# Patient Record
Sex: MALE | Race: WHITE | Employment: OTHER | URBAN - METROPOLITAN AREA
[De-identification: names, ages, dates, MRNs, and addresses within clinical notes are randomized per-mention and may not be internally consistent; named-entity substitution may affect disease eponyms.]

---

## 2021-12-05 ENCOUNTER — APPOINTMENT (OUTPATIENT)
Dept: CT IMAGING | Age: 86
End: 2021-12-05
Payer: MEDICARE

## 2021-12-05 ENCOUNTER — HOSPITAL ENCOUNTER (OUTPATIENT)
Age: 86
Setting detail: OBSERVATION
Discharge: ANOTHER HOSPITAL | End: 2021-12-06
Attending: EMERGENCY MEDICINE
Payer: MEDICARE

## 2021-12-05 DIAGNOSIS — N39.0 URINARY TRACT INFECTION WITHOUT HEMATURIA, SITE UNSPECIFIED: ICD-10-CM

## 2021-12-05 DIAGNOSIS — R26.2 UNABLE TO AMBULATE: ICD-10-CM
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
## 2021-12-05 PROBLEM — I48.21 PERMANENT ATRIAL FIBRILLATION (HCC): Status: ACTIVE | Noted: 2021-12-05

## 2021-12-05 PROBLEM — I10 ESSENTIAL (PRIMARY) HYPERTENSION: Status: ACTIVE | Noted: 2021-12-05

## 2021-12-05 PROBLEM — N30.00 ACUTE CYSTITIS WITHOUT HEMATURIA: Status: ACTIVE | Noted: 2021-12-05

## 2021-12-05 PROBLEM — E11.9 TYPE 2 DIABETES MELLITUS WITHOUT COMPLICATION, WITHOUT LONG-TERM CURRENT USE OF INSULIN (HCC): Status: ACTIVE | Noted: 2021-12-05

## 2021-12-05 LAB
-: ABNORMAL
-: NORMAL
ABSOLUTE EOS #: 0.1 K/UL (ref 0–0.4)
ABSOLUTE IMMATURE GRANULOCYTE: ABNORMAL K/UL (ref 0–0.3)
ABSOLUTE LYMPH #: 0.7 K/UL (ref 1–4.8)
ABSOLUTE MONO #: 0.9 K/UL (ref 0.1–1.2)
AMORPHOUS: ABNORMAL
ANION GAP SERPL CALCULATED.3IONS-SCNC: 13 MMOL/L (ref 9–17)
BACTERIA: ABNORMAL
BASOPHILS # BLD: 0 % (ref 0–2)
BASOPHILS ABSOLUTE: 0 K/UL (ref 0–0.2)
BILIRUBIN URINE: NEGATIVE
BUN BLDV-MCNC: 21 MG/DL (ref 8–23)
BUN/CREAT BLD: ABNORMAL (ref 9–20)
CALCIUM SERPL-MCNC: 9.9 MG/DL (ref 8.6–10.4)
CASTS UA: ABNORMAL /LPF
CHLORIDE BLD-SCNC: 96 MMOL/L (ref 98–107)
CO2: 21 MMOL/L (ref 20–31)
COLOR: YELLOW
COMMENT UA: ABNORMAL
CREAT SERPL-MCNC: 0.7 MG/DL (ref 0.7–1.2)
CRYSTALS, UA: ABNORMAL /HPF
DIFFERENTIAL TYPE: ABNORMAL
EOSINOPHILS RELATIVE PERCENT: 1 % (ref 1–4)
EPITHELIAL CELLS UA: ABNORMAL /HPF (ref 0–5)
GFR AFRICAN AMERICAN: >60 ML/MIN
GFR NON-AFRICAN AMERICAN: >60 ML/MIN
GFR SERPL CREATININE-BSD FRML MDRD: ABNORMAL ML/MIN/{1.73_M2}
GFR SERPL CREATININE-BSD FRML MDRD: ABNORMAL ML/MIN/{1.73_M2}
GLUCOSE BLD-MCNC: 183 MG/DL (ref 70–99)
GLUCOSE URINE: NEGATIVE
HCT VFR BLD CALC: 37.3 % (ref 41–53)
HEMOGLOBIN: 13.3 G/DL (ref 13.5–17.5)
IMMATURE GRANULOCYTES: ABNORMAL %
INR BLD: 1
KETONES, URINE: NEGATIVE
LEUKOCYTE ESTERASE, URINE: ABNORMAL
LYMPHOCYTES # BLD: 7 % (ref 24–44)
MCH RBC QN AUTO: 32.8 PG (ref 26–34)
MCHC RBC AUTO-ENTMCNC: 35.5 G/DL (ref 31–37)
MCV RBC AUTO: 92.2 FL (ref 80–100)
MONOCYTES # BLD: 10 % (ref 2–11)
MUCUS: ABNORMAL
NITRITE, URINE: POSITIVE
NRBC AUTOMATED: ABNORMAL PER 100 WBC
OTHER OBSERVATIONS UA: ABNORMAL
PARTIAL THROMBOPLASTIN TIME: 25.6 SEC (ref 21.3–31.3)
PDW BLD-RTO: 14.3 % (ref 12.5–15.4)
PH UA: 7 (ref 5–8)
PLATELET # BLD: 255 K/UL (ref 140–450)
PLATELET ESTIMATE: ABNORMAL
PMV BLD AUTO: 8.2 FL (ref 6–12)
POTASSIUM SERPL-SCNC: 4.3 MMOL/L (ref 3.7–5.3)
PROTEIN UA: ABNORMAL
PROTHROMBIN TIME: 10.7 SEC (ref 9.4–12.6)
RBC # BLD: 4.05 M/UL (ref 4.5–5.9)
RBC # BLD: ABNORMAL 10*6/UL
RBC UA: ABNORMAL /HPF (ref 0–2)
REASON FOR REJECTION: NORMAL
RENAL EPITHELIAL, UA: ABNORMAL /HPF
SARS-COV-2, RAPID: NOT DETECTED
SEG NEUTROPHILS: 82 % (ref 36–66)
SEGMENTED NEUTROPHILS ABSOLUTE COUNT: 7.7 K/UL (ref 1.8–7.7)
SODIUM BLD-SCNC: 130 MMOL/L (ref 135–144)
SPECIFIC GRAVITY UA: 1.01 (ref 1–1.03)
SPECIMEN DESCRIPTION: NORMAL
TRICHOMONAS: ABNORMAL
TURBIDITY: ABNORMAL
URINE HGB: NEGATIVE
UROBILINOGEN, URINE: NORMAL
WBC # BLD: 9.4 K/UL (ref 3.5–11)
WBC # BLD: ABNORMAL 10*3/UL
WBC UA: ABNORMAL /HPF (ref 0–5)
YEAST: ABNORMAL
ZZ NTE CLEAN UP: ORDERED TEST: NORMAL
ZZ NTE WITH NAME CLEAN UP: SPECIMEN SOURCE: NORMAL

## 2021-12-05 PROCEDURE — G0378 HOSPITAL OBSERVATION PER HR: HCPCS

## 2021-12-05 PROCEDURE — 99285 EMERGENCY DEPT VISIT HI MDM: CPT

## 2021-12-05 PROCEDURE — 2580000003 HC RX 258: Performed by: EMERGENCY MEDICINE

## 2021-12-05 PROCEDURE — 93005 ELECTROCARDIOGRAM TRACING: CPT | Performed by: EMERGENCY MEDICINE

## 2021-12-05 PROCEDURE — 85025 COMPLETE CBC W/AUTO DIFF WBC: CPT

## 2021-12-05 PROCEDURE — 70450 CT HEAD/BRAIN W/O DYE: CPT

## 2021-12-05 PROCEDURE — 70486 CT MAXILLOFACIAL W/O DYE: CPT

## 2021-12-05 PROCEDURE — 85730 THROMBOPLASTIN TIME PARTIAL: CPT

## 2021-12-05 PROCEDURE — 96365 THER/PROPH/DIAG IV INF INIT: CPT

## 2021-12-05 PROCEDURE — 99219 PR INITIAL OBSERVATION CARE/DAY 50 MINUTES: CPT | Performed by: INTERNAL MEDICINE

## 2021-12-05 PROCEDURE — 87635 SARS-COV-2 COVID-19 AMP PRB: CPT

## 2021-12-05 PROCEDURE — 96374 THER/PROPH/DIAG INJ IV PUSH: CPT

## 2021-12-05 PROCEDURE — 87186 SC STD MICRODIL/AGAR DIL: CPT

## 2021-12-05 PROCEDURE — 87086 URINE CULTURE/COLONY COUNT: CPT

## 2021-12-05 PROCEDURE — 36415 COLL VENOUS BLD VENIPUNCTURE: CPT

## 2021-12-05 PROCEDURE — 96361 HYDRATE IV INFUSION ADD-ON: CPT

## 2021-12-05 PROCEDURE — 85610 PROTHROMBIN TIME: CPT

## 2021-12-05 PROCEDURE — 81001 URINALYSIS AUTO W/SCOPE: CPT

## 2021-12-05 PROCEDURE — 86403 PARTICLE AGGLUT ANTBDY SCRN: CPT

## 2021-12-05 PROCEDURE — 6360000002 HC RX W HCPCS: Performed by: EMERGENCY MEDICINE

## 2021-12-05 PROCEDURE — 80048 BASIC METABOLIC PNL TOTAL CA: CPT

## 2021-12-05 RX ORDER — TAMSULOSIN HYDROCHLORIDE 0.4 MG/1
0.4 CAPSULE ORAL DAILY
COMMUNITY

## 2021-12-05 RX ORDER — POLYETHYLENE GLYCOL 3350 17 G/17G
17 POWDER, FOR SOLUTION ORAL DAILY PRN
Status: CANCELLED | OUTPATIENT
Start: 2021-12-05

## 2021-12-05 RX ORDER — MAGNESIUM SULFATE 1 G/100ML
1000 INJECTION INTRAVENOUS PRN
Status: CANCELLED | OUTPATIENT
Start: 2021-12-05

## 2021-12-05 RX ORDER — POTASSIUM CHLORIDE 7.45 MG/ML
10 INJECTION INTRAVENOUS PRN
Status: CANCELLED | OUTPATIENT
Start: 2021-12-05

## 2021-12-05 RX ORDER — 0.9 % SODIUM CHLORIDE 0.9 %
500 INTRAVENOUS SOLUTION INTRAVENOUS ONCE
Status: COMPLETED | OUTPATIENT
Start: 2021-12-05 | End: 2021-12-05

## 2021-12-05 RX ORDER — OMEPRAZOLE 20 MG/1
20 CAPSULE, DELAYED RELEASE ORAL DAILY
COMMUNITY

## 2021-12-05 RX ORDER — ACETAMINOPHEN 650 MG/1
650 SUPPOSITORY RECTAL EVERY 6 HOURS PRN
Status: CANCELLED | OUTPATIENT
Start: 2021-12-05

## 2021-12-05 RX ORDER — ONDANSETRON 2 MG/ML
4 INJECTION INTRAMUSCULAR; INTRAVENOUS EVERY 6 HOURS PRN
Status: CANCELLED | OUTPATIENT
Start: 2021-12-05

## 2021-12-05 RX ORDER — SODIUM CHLORIDE 9 MG/ML
25 INJECTION, SOLUTION INTRAVENOUS PRN
Status: CANCELLED | OUTPATIENT
Start: 2021-12-05

## 2021-12-05 RX ORDER — ACETAMINOPHEN 325 MG/1
650 TABLET ORAL EVERY 6 HOURS PRN
Status: CANCELLED | OUTPATIENT
Start: 2021-12-05

## 2021-12-05 RX ORDER — CEPHALEXIN 500 MG/1
500 CAPSULE ORAL 2 TIMES DAILY
Qty: 20 CAPSULE | Refills: 0 | Status: SHIPPED | OUTPATIENT
Start: 2021-12-06 | End: 2021-12-06 | Stop reason: SDUPTHER

## 2021-12-05 RX ORDER — METFORMIN HYDROCHLORIDE 750 MG/1
750 TABLET, EXTENDED RELEASE ORAL
COMMUNITY

## 2021-12-05 RX ORDER — SODIUM CHLORIDE 0.9 % (FLUSH) 0.9 %
10 SYRINGE (ML) INJECTION PRN
Status: CANCELLED | OUTPATIENT
Start: 2021-12-05

## 2021-12-05 RX ORDER — SODIUM CHLORIDE 0.9 % (FLUSH) 0.9 %
5-40 SYRINGE (ML) INJECTION EVERY 12 HOURS SCHEDULED
Status: CANCELLED | OUTPATIENT
Start: 2021-12-05

## 2021-12-05 RX ORDER — FINASTERIDE 5 MG/1
5 TABLET, FILM COATED ORAL DAILY
COMMUNITY

## 2021-12-05 RX ORDER — POTASSIUM CHLORIDE 20 MEQ/1
40 TABLET, EXTENDED RELEASE ORAL PRN
Status: CANCELLED | OUTPATIENT
Start: 2021-12-05

## 2021-12-05 RX ORDER — ONDANSETRON 4 MG/1
4 TABLET, ORALLY DISINTEGRATING ORAL EVERY 8 HOURS PRN
Status: CANCELLED | OUTPATIENT
Start: 2021-12-05

## 2021-12-05 RX ORDER — ATORVASTATIN CALCIUM 40 MG/1
40 TABLET, FILM COATED ORAL DAILY
COMMUNITY

## 2021-12-05 RX ADMIN — SODIUM CHLORIDE 500 ML: 9 INJECTION, SOLUTION INTRAVENOUS at 17:21

## 2021-12-05 RX ADMIN — CEFTRIAXONE SODIUM 1000 MG: 1 INJECTION, POWDER, FOR SOLUTION INTRAMUSCULAR; INTRAVENOUS at 17:21

## 2021-12-05 ASSESSMENT — ENCOUNTER SYMPTOMS
CHEST TIGHTNESS: 0
SHORTNESS OF BREATH: 0
ABDOMINAL PAIN: 0

## 2021-12-05 NOTE — ED NOTES
Dr Murillo/PCT x2 at bedside with walker/gait belt to assess ambulation     Jovanny Hobbs RN  12/05/21 1432

## 2021-12-05 NOTE — ED NOTES
Jose Luis Acevedo from [address] [facility] services contacted at [phone].  She will contact son in room at number given to discuss placement options in ECF/rehab/ SNF     Hay Langford RN  12/05/21 3938

## 2021-12-05 NOTE — ED NOTES
Dr Dueñas consulted for admission- pt unable to stand using walker- ambulate- gait extremely unsteady.       Jovanny Hobbs, RN  12/05/21 [id number]/Torsten Hunter RN  12/05/21 9984 home

## 2021-12-05 NOTE — ED PROVIDER NOTES
history on file. SOCIAL HISTORY       Social History     Socioeconomic History    Marital status: Single     Spouse name: Not on file    Number of children: Not on file    Years of education: Not on file    Highest education level: Not on file   Occupational History    Not on file   Tobacco Use    Smoking status: Not on file    Smokeless tobacco: Not on file   Substance and Sexual Activity    Alcohol use: Not on file    Drug use: Not on file    Sexual activity: Not on file   Other Topics Concern    Not on file   Social History Narrative    Not on file     Social Determinants of Health     Financial Resource Strain:     Difficulty of Paying Living Expenses: Not on file   Food Insecurity:     Worried About Running Out of Food in the Last Year: Not on file    Jonathan of Food in the Last Year: Not on file   Transportation Needs:     Lack of Transportation (Medical): Not on file    Lack of Transportation (Non-Medical):  Not on file   Physical Activity:     Days of Exercise per Week: Not on file    Minutes of Exercise per Session: Not on file   Stress:     Feeling of Stress : Not on file   Social Connections:     Frequency of Communication with Friends and Family: Not on file    Frequency of Social Gatherings with Friends and Family: Not on file    Attends Jain Services: Not on file    Active Member of [address] [place] [address] [employer] or Organizations: Not on file    Attends Club or Organization Meetings: Not on file    Marital Status: Not on file   Intimate Partner Violence:     Fear of Current or Ex-Partner: Not on file    Emotionally Abused: Not on file    Physically Abused: Not on file    Sexually Abused: Not on file   Housing Stability:     Unable to Pay for Housing in the Last Year: Not on file    Number of [place] in the Last Year: Not on file    Unstable Housing in the Last Year: Not on file       SCREENINGS    Diya Coma Scale  Eye Opening: Spontaneous  Best Verbal Response: Oriented  Best Motor Response: Obeys commands  Diya Coma Scale Score: 15        PHYSICAL EXAM    (up to 7 for level 4, 8 or more for level 5)     ED Triage Vitals [12/05/21 1421]   BP Temp Temp src Pulse Resp SpO2 Height Weight   (!) 141/96 -- -- 75 14 96 % 5' 10\" (1.778 m) 167 lb (75.8 kg)       Physical Exam  Vitals reviewed. Constitutional:       General: He is not in acute distress. Appearance: Normal appearance. He is not ill-appearing. HENT:      Head: Normocephalic. Right Ear: External ear normal.      Ears:      Comments: Patient has a hematoma of the inferior thirds of the left auricle. There is no obvious laceration. No drainage is seen coming from the ear canal.     Nose: Nose normal.      Mouth/Throat:      Mouth: Mucous membranes are moist.   Eyes:      Extraocular Movements: Extraocular movements intact. Pupils: Pupils are equal, round, and reactive to light. Cardiovascular:      Rate and Rhythm: Normal rate. Rhythm irregular. Pulmonary:      Effort: Pulmonary effort is normal. No respiratory distress. Breath sounds: Normal breath sounds. Chest:      Chest wall: No tenderness. Abdominal:      Palpations: Abdomen is soft. Tenderness: There is no abdominal tenderness. Musculoskeletal:         General: Normal range of motion. Cervical back: Neck supple. No tenderness. Right lower leg: Edema present. Left lower leg: Edema present. Comments: Superficial skin tear over the posterior aspect of the left elbow with no effusion or limitation in range of motion and no deformity. The rest of the bony survey of his extremities is negative. Skin:     General: Skin is warm and dry. Coloration: Skin is not pale. Neurological:      General: No focal deficit present. Mental Status: He is alert and oriented to person, place, and time.          DIAGNOSTIC RESULTS     EKG: All EKG's are interpreted by the Emergency Department Physician who either signs orCo-signs this chart in the absence of a cardiologist.    Atrial fibrillation with controlled rate of 78 beats a minute. Normal axis. Old septal infarct. No acute ischemic change. RADIOLOGY:     Interpretation per the Radiologist below, ifavailable at the time of this note:    CT Head WO Contrast   Final Result   No acute intracranial abnormality. Diffuse atrophic changes with findings suggesting chronic microvascular   ischemia         CT FACIAL BONES WO CONTRAST   Final Result   No acute traumatic injury of the facial bones. ED BEDSIDE ULTRASOUND:   Performed by ED Physician - none    LABS:  Labs Reviewed   CBC WITH AUTO DIFFERENTIAL - Abnormal; Notable for the following components:       Result Value    RBC 4.05 (*)     Hemoglobin 13.3 (*)     Hematocrit 37.3 (*)     Seg Neutrophils 82 (*)     Lymphocytes 7 (*)     Absolute Lymph # 0.70 (*)     All other components within normal limits   BASIC METABOLIC PANEL W/ REFLEX TO MG FOR LOW K - Abnormal; Notable for the following components:    Glucose 183 (*)     Sodium 130 (*)     Chloride 96 (*)     All other components within normal limits   URINE RT REFLEX TO CULTURE - Abnormal; Notable for the following components:    Turbidity UA SLIGHTLY CLOUDY (*)     Protein, UA 2+ (*)     Nitrite, Urine POSITIVE (*)     Leukocyte Esterase, Urine MODERATE (*)     All other components within normal limits   MICROSCOPIC URINALYSIS - Abnormal; Notable for the following components:    Bacteria, UA MANY (*)     Other Observations UA Culture ordered based on defined criteria. (*)     All other components within normal limits   CULTURE, URINE   SPECIMEN REJECTION   PROTIME-INR   APTT   PREVIOUS SPECIMEN       All other labs were within normal range ornot returned as of this dictation.     EMERGENCY DEPARTMENT COURSE and DIFFERENTIAL DIAGNOSIS/MDM:   Vitals:    Vitals:    12/05/21 1421 12/05/21 1722 12/05/21 1723   BP: (!) 141/96 (!) 194/78    Pulse: 75 77 Resp: 14 15    Temp:   97.5 °F (36.4 °C)   TempSrc:   Oral   SpO2: 96% 99%    Weight: 75.8 kg (167 lb)     Height: 5' 10\" (1.778 m)           Patient has UTI. Imaging of the head and face is negative. Baseline bloodwork is also unremarkable. The plan is to treat him with 1/2 L of IV fluids and Rocephin 1 g IV. He will be placed on Keflex at home for his UTI and will be referred to his primary care physician for further follow-up with instructions to use a cane or walker for assisted ambulation and to return for worsening symptoms. ED Course as of 12/05/21 1807   Sun Dec 05, 2021   1805 Patient wants to be discharged with his son stated time that he was unable to get his father to get up and walk by himself and he feels that he is not safe at home. Patient was assisted off the bed but even standing while using a walker and with a gait belt around him he could not take a single step and could not maintain his balance. The plan is to admit the patient now and he has been accepted by Dr. Mathieu Mcnair. [SH]      MYNOR Diaz  [SH] Kelsey Rae MD       MDM    CONSULTS:  None    PROCEDURES:  Unlessotherwise noted below, none     Procedures    FINAL IMPRESSION      1. Fall, initial encounter    2. Urinary tract infection without hematuria, site unspecified    3.  Unable to ambulate          DISPOSITION/PLAN   DISPOSITION Admitted 12/05/2021 06:05:53 PM      PATIENT REFERRED TO:  Filiberto Galvez MD  [facility] [address] [place] [address]  [phone]            DISCHARGE MEDICATIONS:         Problem List:  Patient Active Problem List   Diagnosis Code    Unable to ambulate R26.2           Summation      Patient Course: Discharged    ED Medicationsadministered this visit:    Medications   cefTRIAXone (ROCEPHIN) 1000 mg IVPB in 50 mL D5W minibag (1,000 mg IntraVENous New Bag 12/5/21 1721)   0.9 % sodium chloride bolus (500 mLs IntraVENous New Bag 12/5/21 1721)       New Prescriptions from this visit:    New Prescriptions    CEPHALEXIN (KEFLEX) 500 MG CAPSULE    Take 1 capsule by mouth 2 times daily for 10 days       Follow-up:  [facility] [address] [place] [address]  [phone]              Final Impression:   1. Fall, initial encounter    2. Urinary tract infection without hematuria, site unspecified    3.  Unable to ambulate               (Please note that portions of this note were completed with a voice recognitionprogram.  Efforts were made to edit the dictations but occasionally words are mis-transcribed.)    Isaak Thomas MD (electronically signed)  Attending Emergency Physician            Isaak Thomas MD  12/05/21 3356 Nicolás Metz MD  12/05/21 6234

## 2021-12-05 NOTE — H&P
[facility]  Office: [address], DO, Bijan Carrillo, DO, Hira Acevedo, DO, Kari Estevez Blood, DO, Brenda Pineda MD, Esperanza Pozo MD, Lorene Del Valle MD, Maylin Lui MD, Wellington Patiño MD, Jl Connelly MD, Patricia Holloway MD, Vazquez Perez, DO, Corinna Langston, DO, María Malik MD,  Zhou Burris DO, Ronald Bell MD, Bunny Ramirez MD, Boubacar Dueñas MD, Mary Hdez MD, Armando Fitzpatrick MD, Ricco Mcbride MD, Alvina Griffith MD, Davion Reid, [facility], [facility], CNP, Lorri Cortes, CNP, Adam Handley, CNS, Ailene Lesches, CNP, Martha Richards, CNP, Jennifer Bro, CNP, Rabia Jenkins, CNP, Dona Jaramillo, CNP, Vicenet Hayes PA-C, Kvng Collado, [facility], Alfredo Bustillos, CNP, Gabriella Gutierrez, CNP, Boris Ovalle, CNP, Kacie Gregory, CNP, Nanda Fernando, CNP, Penny Aguila, CNP, Clarisa Juárez, CNP         [facility]   [address] [facility]    HISTORY AND PHYSICAL EXAMINATION            Date:   12/5/2021  Patient name:  William Maravilla  Date of admission:  12/5/2021  2:05 PM  MRN:   [id number]  Account:  [de-identified]  YOB: 1935  PCP:    Casandra Lewis MD  Room:   ER/ER08  Code Status:    No Order    Chief Complaint:     Chief Complaint   Patient presents with    Fall       History Obtained From:     patient, electronic medical record    History of Present Illness:     William Maravilla is a 82 y.o. Non- / non  male who presents with Fall   and is admitted to the hospital for the management of Unable to ambulate. This 82 yom presents with a fall. He states he tripped over his feet and fell. No loc but did hit his head. No syncope. Apparently son stated he was uncomfortable taking him back home and when ER staff tried to walk him, he basically just stood there and wouldn't take steps.   Took couple people to hold him up    Past Medical History:     Past Medical History:   Diagnosis Date    Dyslipidemia     Essential (primary) hypertension     Permanent atrial fibrillation (HCC)     Type 2 diabetes mellitus without complication, without long-term current use of insulin ([facility] [address]         Past Surgical History:     Past Surgical History:   Procedure Laterality Date    HIP SURGERY          Medications Prior to Admission:     Prior to Admission medications    Medication Sig Start Date End Date Taking? Authorizing Provider   cephALEXin (KEFLEX) 500 MG capsule Take 1 capsule by mouth 2 times daily for 10 days 12/6/21 12/16/21 Yes Fabricio Babb MD        Allergies:     Patient has no known allergies. Social History:     Tobacco:    reports that he has quit smoking. He does not have any smokeless tobacco history on file. Alcohol:      reports previous alcohol use. Drug Use:  reports no history of drug use. Family History:     Family History   Problem Relation Age of Onset    Coronary Art Dis Father        Review of Systems:     Positive and Negative as described in HPI.     CONSTITUTIONAL:  negative for fevers, chills, sweats, weight loss  HEENT:  negative for vision, hearing changes, runny nose, throat pain  RESPIRATORY:  negative for shortness of breath, cough, congestion, wheezing  CARDIOVASCULAR:  negative for chest pain, palpitations  GASTROINTESTINAL:  negative for nausea, vomiting, diarrhea, constipation, change in bowel habits, abdominal pain   GENITOURINARY:  negative for difficulty of urination, burning with urination, frequency   INTEGUMENT:  negative for rash, skin lesions, easy bruising   HEMATOLOGIC/LYMPHATIC:  negative for swelling/edema   ALLERGIC/IMMUNOLOGIC:  negative for urticaria , itching  ENDOCRINE:  negative increase in drinking, increase in urination, hot or cold intolerance  MUSCULOSKELETAL:  negative joint pains, muscle aches, swelling of joints; has generalized weakness  NEUROLOGICAL:  negative for headaches, dizziness, lightheadedness, numbness, pain, tingling extremities  BEHAVIOR/PSYCH:  negative for depression, anxiety    Physical Exam:   BP (!) 194/78   Pulse 77   Temp 97.5 °F (36.4 °C) (Oral)   Resp 15   Ht 5' 10\" (1.778 m)   Wt 167 lb (75.8 kg)   SpO2 99%   BMI 23.96 kg/m²   Temp (24hrs), Av.5 °F (36.4 °C), Min:97.5 °F (36.4 °C), Max:97.5 °F (36.4 °C)    No results for input(s): POCGLU in the last 72 hours.     Intake/Output Summary (Last 24 hours) at 2021 1843  Last data filed at 2021 1809  Gross per 24 hour   Intake 50 ml   Output 250 ml   Net -200 ml       General Appearance:  alert, well appearing, and in no acute distress  Mental status: oriented to person, place, and time  Head:  normocephalic, atraumatic  Eye: no icterus, redness, pupils equal and reactive, extraocular eye movements intact, conjunctiva clear  Ear: normal external ear, no discharge, hearing intact  Nose:  no drainage noted  Mouth: mucous membranes moist  Neck: supple, no carotid bruits, thyroid not palpable  Lungs: Bilateral equal air entry, clear to ausculation, no wheezing, rales or rhonchi, normal effort  Cardiovascular: normal rate, irregularly irregular rhythm, no murmur, gallop, rub  Abdomen: Soft, nontender, nondistended, normal bowel sounds, no hepatomegaly or splenomegaly  Neurologic: There are no new focal motor or sensory deficits, normal muscle tone and bulk, no abnormal sensation, normal speech, cranial nerves II through XII grossly intact  Skin: No gross lesions, rashes, bruising or bleeding on exposed skin area  Extremities:  peripheral pulses palpable, no pedal edema or calf pain with palpation  Psych: normal affect     Investigations:      Laboratory Testing:  Recent Results (from the past 24 hour(s))   EKG 12 Lead    Collection Time: 21  2:48 PM   Result Value Ref Range    Ventricular Rate 78 BPM    Atrial Rate 91 BPM    QRS Duration 80 ms    Q-T Interval 382 ms    QTc Calculation (Bazett) 435 ms    R Axis 3 degrees    T Axis 27 degrees   CBC Auto Differential    Collection Time: 21 2:54 PM   Result Value Ref Range    WBC 9.4 3.5 - 11.0 k/uL    RBC 4.05 (L) 4.5 - 5.9 m/uL    Hemoglobin 13.3 (L) 13.5 - 17.5 g/dL    Hematocrit 37.3 (L) 41 - 53 %    MCV 92.2 80 - 100 fL    MCH 32.8 26 - 34 pg    MCHC 35.5 31 - 37 g/dL    RDW 14.3 12.5 - 15.4 %    Platelets 879 505 - 729 k/uL    MPV 8.2 6.0 - 12.0 fL    NRBC Automated NOT REPORTED per 100 WBC    Differential Type NOT REPORTED     Seg Neutrophils 82 (H) 36 - 66 %    Lymphocytes 7 (L) 24 - 44 %    Monocytes 10 2 - 11 %    Eosinophils % 1 1 - 4 %    Basophils 0 0 - 2 %    Immature Granulocytes NOT REPORTED 0 %    Segs Absolute 7.70 1.8 - 7.7 k/uL    Absolute Lymph # 0.70 (L) 1.0 - 4.8 k/uL    Absolute Mono # 0.90 0.1 - 1.2 k/uL    Absolute Eos # 0.10 0.0 - 0.4 k/uL    Basophils Absolute 0.00 0.0 - 0.2 k/uL    Absolute Immature Granulocyte NOT REPORTED 0.00 - 0.30 k/uL    WBC Morphology NOT REPORTED     RBC Morphology NOT REPORTED     Platelet Estimate NOT REPORTED    Basic Metabolic Panel w/ Reflex to MG    Collection Time: 12/05/21  2:54 PM   Result Value Ref Range    Glucose 183 (H) 70 - 99 mg/dL    BUN 21 8 - 23 mg/dL    CREATININE 0.70 0.70 - 1.20 mg/dL    Bun/Cre Ratio NOT REPORTED 9 - 20    Calcium 9.9 8.6 - 10.4 mg/dL    Sodium 130 (L) 135 - 144 mmol/L    Potassium 4.3 3.7 - 5.3 mmol/L    Chloride 96 (L) 98 - 107 mmol/L    CO2 21 20 - 31 mmol/L    Anion Gap 13 9 - 17 mmol/L    GFR Non-African American >60 >60 mL/min    GFR African American >60 >60 mL/min    GFR Comment          GFR Staging NOT REPORTED    SPECIMEN REJECTION    Collection Time: 12/05/21  2:54 PM   Result Value Ref Range    Specimen Source . BLOOD     Ordered Test PT PTT     Reason for Rejection Unable to perform testing: No specimen received.      - NOT REPORTED    Protime-INR    Collection Time: 12/05/21  3:10 PM   Result Value Ref Range    Protime 10.7 9.4 - 12.6 sec    INR 1.0    APTT    Collection Time: 12/05/21  3:10 PM   Result Value Ref Range    PTT 25.6 21.3 - 31.3 sec Urinalysis Reflex to Culture    Collection Time: 12/05/21  4:00 PM    Specimen: Urine, clean catch   Result Value Ref Range    Color, UA Yellow Yellow    Turbidity UA SLIGHTLY CLOUDY (A) Clear    Glucose, Ur NEGATIVE NEGATIVE    Bilirubin Urine NEGATIVE NEGATIVE    Ketones, Urine NEGATIVE NEGATIVE    Specific Gravity, UA 1.015 1.005 - 1.030    Urine Hgb NEGATIVE NEGATIVE    pH, UA 7.0 5.0 - 8.0    Protein, UA 2+ (A) NEGATIVE    Urobilinogen, Urine Normal Normal    Nitrite, Urine POSITIVE (A) NEGATIVE    Leukocyte Esterase, Urine MODERATE (A) NEGATIVE    Urinalysis Comments NOT REPORTED    Microscopic Urinalysis    Collection Time: 12/05/21  4:00 PM   Result Value Ref Range    -          WBC, UA TOO NUMEROUS TO COUNT 0 - 5 /HPF    RBC, UA 0 TO 2 0 - 2 /HPF    Casts UA NOT REPORTED /LPF    Crystals, UA NOT REPORTED None /HPF    Epithelial Cells UA 5 TO 10 0 - 5 /HPF    Renal Epithelial, UA NOT REPORTED 0 /HPF    Bacteria, UA MANY (A) None    Mucus, UA NOT REPORTED None    Trichomonas, UA NOT REPORTED None    Amorphous, UA NOT REPORTED None    Other Observations UA Culture ordered based on defined criteria. (A) NOT REQ. Yeast, UA NOT REPORTED None       Imaging/Diagnostics:  CT Head WO Contrast    Result Date: 12/5/2021  No acute intracranial abnormality. Diffuse atrophic changes with findings suggesting chronic microvascular ischemia     CT FACIAL BONES WO CONTRAST    Result Date: 12/5/2021  No acute traumatic injury of the facial bones. Assessment :      Hospital Problems           Last Modified POA    * (Principal) Unable to ambulate 12/5/2021 Yes    Type 2 diabetes mellitus without complication, without long-term current use of insulin ([address] 12/5/2021 Yes    Essential (primary) hypertension 12/5/2021 Yes    Permanent atrial fibrillation ([address] 12/5/2021 Yes    Acute cystitis without hematuria 12/5/2021 Yes          Plan:     Patient status observation in the Med/Surge    1.  Pt/ot evals  2. lsw for snf

## 2021-12-05 NOTE — ED NOTES
Social work: spoke to son Urmila [facility] [phone] His number is incorrect in the computer. Son asked that pt be placed as he cannot walk today. Son is open to any place in [facility] is the place his dad liked best in the past. However he is open to [facility] taking pt for rehab then if pt needs long term they can go closer to home if needed. Either is fine with son. He will be available by phone. Face sheet sent to [facility]. And will try to find [employer] phone and fax to leave a message for them also. Pt has medicare and co insurance he may have skilled time. He was last in a snf 9 months ago per son and he did like the PT at [facility].   Will need PT EVAL in am if possible and / to help with atul, Rx and discharge order for vladimir/yoan Anthony, NANCIE, LEYDA  12/05/21 7368

## 2021-12-05 NOTE — ED NOTES
Son speaking with Dr Chantal Rivera re: not feeling comfortable taking pt home     David Merrill RN  12/05/21 8296

## 2021-12-06 VITALS
OXYGEN SATURATION: 95 % | BODY MASS INDEX: 23.91 KG/M2 | HEART RATE: 77 BPM | RESPIRATION RATE: 16 BRPM | DIASTOLIC BLOOD PRESSURE: 65 MMHG | TEMPERATURE: 98.7 F | WEIGHT: 167 LBS | SYSTOLIC BLOOD PRESSURE: 119 MMHG | HEIGHT: 70 IN

## 2021-12-06 LAB
EKG ATRIAL RATE: 91 BPM
EKG Q-T INTERVAL: 382 MS
EKG QRS DURATION: 80 MS
EKG QTC CALCULATION (BAZETT): 435 MS
EKG R AXIS: 3 DEGREES
EKG T AXIS: 27 DEGREES
EKG VENTRICULAR RATE: 78 BPM

## 2021-12-06 PROCEDURE — 99217 PR OBSERVATION CARE DISCHARGE MANAGEMENT: CPT | Performed by: INTERNAL MEDICINE

## 2021-12-06 PROCEDURE — G0378 HOSPITAL OBSERVATION PER HR: HCPCS

## 2021-12-06 PROCEDURE — 97530 THERAPEUTIC ACTIVITIES: CPT

## 2021-12-06 PROCEDURE — 97162 PT EVAL MOD COMPLEX 30 MIN: CPT

## 2021-12-06 PROCEDURE — 97166 OT EVAL MOD COMPLEX 45 MIN: CPT

## 2021-12-06 PROCEDURE — 97535 SELF CARE MNGMENT TRAINING: CPT

## 2021-12-06 PROCEDURE — 6360000002 HC RX W HCPCS: Performed by: EMERGENCY MEDICINE

## 2021-12-06 RX ORDER — ALBUTEROL SULFATE 2.5 MG/3ML
2.5 SOLUTION RESPIRATORY (INHALATION) EVERY 4 HOURS PRN
Status: DISCONTINUED | OUTPATIENT
Start: 2021-12-06 | End: 2021-12-06 | Stop reason: HOSPADM

## 2021-12-06 RX ORDER — CEPHALEXIN 500 MG/1
500 CAPSULE ORAL 2 TIMES DAILY
Qty: 10 CAPSULE | Refills: 0 | DISCHARGE
Start: 2021-12-06 | End: 2021-12-11

## 2021-12-06 RX ADMIN — ALBUTEROL SULFATE 2.5 MG: 2.5 SOLUTION RESPIRATORY (INHALATION) at 06:39

## 2021-12-06 NOTE — ED NOTES
Pt arrives via EMS from home post fall in bathroom. Pt states he was using walker- attempting to back out of restroom when he fell- striking left arm/side of head landing on hip. . Pt alert, oriented but per family may have onset dementia- will at times make statements not relate to situation. Son reports pt increased confusion and weakness.       Sorin Yanes RN  12/05/21 6907

## 2021-12-06 NOTE — PROGRESS NOTES
Activity Restriction  Other position/activity restrictions: Up with assistance  Vision/Hearing  Vision: Impaired  Vision Exceptions: Wears glasses for reading  Hearing: Exceptions to [facility]  Hearing Exceptions: Hard of hearing/hearing concerns     Subjective  General  Patient assessed for rehabilitation services?: Yes   Response To Previous Treatment: Not applicable  Family / Caregiver Present: No  Follows Commands:  (simple commands with increased time- slow to process)  Subjective  Subjective: Pt supine in bed and agreeable to therapy this morning. Pt denies pain when asked. RN agreeable to therapy. Pain Screening  Patient Currently in Pain: Denies   Vital Signs  Patient Currently in Pain: Denies        Orientation  Orientation  Overall Orientation Status: Impaired  Orientation Level: Oriented to place; Oriented to person; Disoriented to time; Disoriented to situation  Social/Functional History  Social/Functional History  Lives With: Family (son Jennifer Sprague lives with him)  Type of Home: House  Home Layout: Two level, Able to Live on Main level with bedroom/bathroom, Performs ADL's on one level  Home Access: Level entry  Bathroom Shower/Tub: Walk-in shower  Bathroom Toilet: Handicap height  Bathroom Equipment: Grab bars in shower, Shower chair, Grab bars around toilet  Home Equipment: Rolling walker  ADL Assistance: Independent (Pt reports independent but notes he has had a harder time recently and his son will help off and on.  Notes even when ADLs have been difficult he has been able to still perform his toileting independently.)  Homemaking Assistance: Needs assistance  Homemaking Responsibilities: No (son performs all IADLs and hired cleaning)  Ambulation Assistance: Independent (with walker)  Transfer Assistance: Independent  Active : No  Patient's  Info: son drives  Mode of Transportation: Family, SUV  Occupation: Retired  Type of occupation: Worked for an   Leisure & Hobbies: Enjoys spending time with family and watching TV  Additional Comments: Pt reports his son is retired and home most of the time. He states when his son has to leave that another family member will come stay with him. Pt is a questionable historian and no family is present to confirm any of the above home information. Cognition   Cognition  Overall Cognitive Status: Exceptions  Arousal/Alertness: Delayed responses to stimuli  Following Commands: Follows one step commands with increased time; Follows one step commands with repetition; Inconsistently follows commands  Attention Span: Difficulty dividing attention; Difficulty attending to directions  Memory: Decreased recall of recent events; Decreased short term memory; Decreased recall of precautions  Safety Judgement: Decreased awareness of need for assistance; Decreased awareness of need for safety  Problem Solving: Assistance required to identify errors made; Assistance required to correct errors made; Decreased awareness of errors  Insights: Decreased awareness of deficits  Initiation: Requires cues for all  Sequencing: Requires cues for all    Objective          AROM RLE (degrees)  RLE AROM: WFL  AROM LLE (degrees)  LLE AROM : WFL  AROM RUE (degrees)  RUE AROM : WFL  AROM LUE (degrees)  LUE AROM : WFL  Strength RLE  Comment: difficulty following accurate commands for MMT; able to demonstrate 3+ to 4-/5  Strength LLE  Comment: difficulty following accurate commands for MMT; able to demonstrate 3+ to 4-/5  Strength RUE  Comment: Co evaluation with OT-see OT evaluation for full UE assessment  Strength LUE  Comment: Co evaluation with OT-see OT evaluation for full UE assessment     Sensation  Overall Sensation Status: WFL (Pt denies any numbness or tingling)  Bed mobility  Supine to Sit: Maximum assistance  Sit to Supine: Maximum assistance; 2 Person assistance  Scooting: Maximal assistance  Transfers  Sit to Stand:  Moderate Assistance; 2 Person Assistance  Stand to sit: Moderate Assistance; 2 Person Assistance  Comment: mod to max verbal cues for sequencing  Ambulation  Ambulation?: Yes  More Ambulation?: Yes  Ambulation 1  Surface: level tile  Device: [address]:  Moderate assistance; 2 Person assistance  Quality of Gait: decreased step length, decreased gait speed, poor ability to advance LEs, confusion with RW management  Gait Deviations: Slow Angelica; Decreased step length; Decreased step height  Distance: 1ft  Ambulation 2  Surface - 2: level tile  Device 2:  (bilateral HHA)  Assistance 2: Moderate assistance; 2 Person assistance  Quality of Gait 2: Cues to increase step length and initiate gait, decreased ability to initiate steps without max cues  Gait Deviations: Slow Angelica; Decreased step length; Decreased step height; Shuffles  Distance: 4ft, seated rest break, 5ft  Stairs/Curb  Stairs?: No     Balance  Posture: Poor  Sitting - Static: Poor  Sitting - Dynamic: Poor; -  Standing - Static: Poor  Standing - Dynamic: Poor  Comments: standing balance assessed with RW and HHA        Plan   Plan  Times per week: 5-6x  Times per day: Daily  Current Treatment Recommendations: Strengthening, Transfer Training, Balance Training, Functional Mobility Training, Endurance Training, Gait Training, Home Exercise Program, Safety Education & Training, Patient/Caregiver Education & Training, Cognitive Reorientation  Safety Devices  Type of devices: Call light within reach, Left in bed, Bed alarm in place, Nurse notified, Gait belt (all four bed rails up upon arrival and placed again upon exit)  Restraints  Initially in place: No      AM-PAC Score  AM-PAC Inpatient Mobility Raw Score : 10 (12/06/21 1005)  AM-PAC Inpatient T-Scale Score : 32.29 (12/06/21 1005)  Mobility Inpatient CMS 0-100% Score: 76.75 (12/06/21 1005)  Mobility Inpatient CMS G-Code Modifier : CL (12/06/21 1005)          Goals  Short term goals  Time Frame for Short term goals: 14 visits  Short term goal 1: Pt to ambulate 100ft CGA RW  Short term goal 2: Pt to sit <> stand transfer CGA  Short term goal 3: Pt to demonstrate CGA bed mobility  Short term goal 4: Pt to demonstrate fair + standing balance to decrease risk of falls  Short term goal 5: Pt to tolerate 30 minutes of therapy for endurance       Therapy Time   Individual Concurrent Group Co-treatment   Time In 0903         Time Out 0935         Minutes 32         Timed Code Treatment Minutes: [address] [facility] Yamile Soares, ANA

## 2021-12-06 NOTE — CARE COORDINATION
8420 Call to [facility] in , left message for admissions. [address] with Ellen Morris at [facility], unable to locate referral at this time. Resent. Patient has been evaluated by PT/OT and referrals to [facility] have been sent. Await response.

## 2021-12-06 NOTE — CARE COORDINATION
Discharge [id number] [facility]  Clinical Case Management Department  Written by: Bhavana Duarte RN    Patient Name: Daysi Ackerman  Attending Provider: Amber Crenshaw DO  Admit Date: 2021  2:05 PM  MRN: [id number]  Account: [de-identified]                     : 1935  Discharge Date:  2021      Disposition: [facility] via [facility] transport at 2pm - updated patient son Chantal Harris via phone.     Bhavana Duarte RN

## 2021-12-06 NOTE — PROGRESS NOTES
[facility]  Office: [address], DO, Miriam Carol, DO, Karena Mcghee, DO, Kadeem Ellsworth Blood, DO, Debora Martinez MD, Vesna Hawkins MD, Leticia Cartagena MD, Ray Blood MD, Toñito Howard MD, Katt Handley MD, Lauren Yang MD, Babatunde Miller, DO, Rodney Whitaker, DO, Noemi Jolly MD,  Trevor Diaz DO, Porsche Veras MD, Josey Fernandez MD, Shraddha Unger MD, Madelyn Christian MD, Gemini Dillard MD, Jenna Heath MD, Sal Plascencia MD, Soy Best, [facility], [facility], CNP, Angelique Garcia, CNP, Zachary Haines, CNS, Ananth Guillermo, [facility], Sarika Frausto, CNP, Patrick Rolon, [facility], Gerardo Thomason, [facility], Marcie Millard, CNP, Rowan Oppenheim, PA-C, Tahmina Banks, [facility], Robert Uribe, CNP, Fawn Allen, CNP, Mariam Uriarte, CNP, Miriam Mata, CNP, Yesenia Meraz, CNP, Frank Mejia, CNP, Jamari Canada, Alessandra [address]    Progress Note    12/6/2021    1:09 PM    Name:   Barbara Ugalde  MRN:     [id number]     [place]:      [de-identified]   Room:   [facility]/[address] Day:  0  Admit Date:  12/5/2021  2:05 PM    PCP:   Janel Patel MD  Code Status:  Full Code    Subjective:     C/C:   Chief Complaint   Patient presents with   Orvel Lisa     Interval History Status: improved. Patient seen and examined at bedside today. Patient is only oriented to place. Denies any complaints. No fevers, chills, nausea, vomiting, diarrhea. Brief History: This 82 yom presents with a fall. He states he tripped over his feet and fell. No loc but did hit his head. No syncope. Apparently son stated he was uncomfortable taking him back home and when ER staff tried to walk him, he basically just stood there and wouldn't take steps.   Took couple people to hold him up    Review of Systems:     Constitutional:  negative for chills, fevers, sweats  Respiratory:  negative for cough, dyspnea on exertion, shortness of breath, wheezing  Cardiovascular:  negative for chest pain, chest pressure/discomfort, lower extremity edema, palpitations  Gastrointestinal:  negative for abdominal pain, constipation, diarrhea, nausea, vomiting  Neurological:  negative for dizziness, headache    Medications: Allergies:  No Known Allergies    Current Meds:   Scheduled Meds:    enoxaparin  40 mg SubCUTAneous Daily    cefTRIAXone (ROCEPHIN) IV  1,000 mg IntraVENous Q24H     Continuous Infusions:   PRN Meds: albuterol    Data:     Past Medical History:   has a past medical history of Dyslipidemia, Essential (primary) hypertension, P[facility] and Type 2 diabetes mellitus without complication, without long-term current use of insulin ([facility] Social History:   reports that he has quit smoking. He does not have any smokeless tobacco history on file. He reports previous alcohol use. He reports that he does not use drugs. Family History:   Family History   Problem Relation Age of Onset    Coronary Art Dis Father        Vitals:  /65   Pulse 77   Temp 98.7 °F (37.1 °C) (Oral)   Resp 16   Ht 5' 10\" (1.778 m)   Wt 167 lb (75.8 kg)   SpO2 95%   BMI 23.96 kg/m²   Temp (24hrs), Av.1 °F (36.7 °C), Min:97.5 °F (36.4 °C), Max:98.7 °F (37.1 °C)    No results for input(s): POCGLU in the last 72 hours. I/O (24Hr):     Intake/Output Summary (Last 24 hours) at 2021 1309  Last data filed at 2021 2105  Gross per 24 hour   Intake 550 ml   Output 250 ml   Net 300 ml       Labs:  Hematology:  Recent Labs     21  1454 21  1510   WBC 9.4  --    RBC 4.05*  --    HGB 13.3*  --    HCT 37.3*  --    MCV 92.2  --    MCH 32.8  --    MCHC 35.5  --    RDW 14.3  --      --    MPV 8.2  --    INR  --  1.0     Chemistry:  Recent Labs     21  1454   *   K 4.3   CL 96*   CO2 21   GLUCOSE 183*   BUN 21   CREATININE 0.70   ANIONGAP 13   LABGLOM >60   GFRAA >60   CALCIUM 9.9   No results for input(s): PROT, LABALBU, LABA1C, Y6MZIEH, F8RSXSC, FT4, TSH, AST, ALT, LDH, GGT, ALKPHOS, LABGGT, BILITOT, BILIDIR, AMMONIA, AMYLASE, LIPASE, LACTATE, CHOL, HDL, LDLCHOLESTEROL, CHOLHDLRATIO, TRIG, VLDL, EUN29CW, PHENYTOIN, PHENYF, URICACID, POCGLU in the last 72 hours. ABG:No results found for: POCPH, PHART, PH, POCPCO2, EWF9GOH, PCO2, POCPO2, PO2ART, PO2, POCHCO3, XZN9VBK, HCO3, NBEA, PBEA, BEART, BE, THGBART, THB, XKC2BUI, PVDP8OYD, K9CFOUAD, O2SAT, FIO2  No results found for: SPECIAL  No results found for: CULTURE    Radiology:  CT Head WO Contrast    Result Date: 12/5/2021  No acute intracranial abnormality. Diffuse atrophic changes with findings suggesting chronic microvascular ischemia     CT FACIAL BONES WO CONTRAST    Result Date: 12/5/2021  No acute traumatic injury of the facial bones. Physical Examination:       General appearance:  alert, cooperative and no distress  Mental Status:  oriented to person, not place and time and normal affect  Lungs:  clear to auscultation bilaterally, normal effort  Heart:  regular rate and rhythm, no murmur  Abdomen:  soft, nontender, nondistended, normal bowel sounds, no masses, hepatomegaly, splenomegaly  Extremities:  no edema, redness, tenderness in the calves  Skin:  no gross lesions, rashes, induration    Assessment:     Hospital Problems           Last Modified POA    * (Principal) Unable to ambulate 12/5/2021 Yes    Type 2 diabetes mellitus without complication, without long-term current use of insulin ([address] 12/5/2021 Yes    Essential (primary) hypertension 12/5/2021 Yes    Permanent atrial fibrillation ([address] 12/5/2021 Yes    Acute cystitis without hematuria 12/5/2021 Yes          Plan:     1. Difficulty ambulating: Patient had a mechanical fall while at home. Patient son not comfortable taking care of him. Discussed with case management, they do have SNF available to him today. 2. Dementia: CT showed findings concerning for chronic microvascular changes. Patient could have underlying vascular dementia.   Will need

## 2021-12-06 NOTE — PROGRESS NOTES
Occupational Therapy   Occupational Therapy Initial Assessment  Date: 2021   Patient Name: Magdy Johns  MRN: [id number]     : 1935    Date of Service: 2021    Chief Complaint   Patient presents with   Prince Petties Fall     Discharge Recommendations:  Patient would benefit from continued therapy after discharge       Assessment   Performance deficits / Impairments: Decreased functional mobility ; Decreased ADL status; Decreased strength; Decreased safe awareness; Decreased cognition; Decreased balance; Decreased endurance; Decreased ROM; Decreased fine motor control; Decreased coordination  Assessment: Pt participated in OT eval and tx session this date and demo bed mobility with max A, functional transfers/functional mobility with mod A x2, ADL tasks with mod A-max A; max verbal/tactile cueing was required throughout all functional task performance throughout session due to pt's decreased cognition/safety awareness. Pt will require continued therapy services while hospitalized and at discharge to maximize pt's safety and independence in performing functional tasks. Pt unsafe to return to prior living arrangements at this time, strict 24hr supervision/assistance and skilled therapy services recommended at discharge. Prognosis: Good; Fair  Decision Making: Medium Complexity  OT Education: OT Role; Plan of Care; Precautions; Transfer Training; Equipment; Orientation (Activity Promotion, Safety Awareness/Fall Prevention, Safety with Transfers/RW Mngt; poor return requiring max verbal/tactile cues for carryover)  REQUIRES OT FOLLOW UP: Yes  Activity Tolerance  Activity Tolerance: Treatment limited secondary to decreased cognition; Patient limited by fatigue  Safety Devices  Safety Devices in place: Yes  Type of devices: Call light within reach;  Left in bed; Bed alarm in place; Nurse notified  Restraints  Initially in place: No           Patient Diagnosis(es): The primary encounter diagnosis was Fall, initial encounter. Diagnoses of Urinary tract infection without hematuria, site unspecified and Unable to ambulate were also pertinent to this visit. has a past medical history of Dyslipidemia, Essential (primary) hypertension, Permanent atrial fibrillation ([facility] and Type 2 diabetes mellitus without complication, without long-term current use of insulin ([facility] has a past surgical history that includes hip surgery. Restrictions  Restrictions/Precautions  Restrictions/Precautions: Fall Risk  Required Braces or Orthoses?: No  Position Activity Restriction  Other position/activity restrictions: Up with assistance    Subjective   General  Patient assessed for rehabilitation services?: Yes   Family / Caregiver Present: No  General Comment  Comments: RN ok'd for therapy this AM. Pt agreeable to participate in session and pleasantly confused throughout, pt very cooperative. Patient Currently in Pain: Denies    Social/Functional History  Social/Functional History  Lives With: Family (son Jonathan Schumacher lives with him)  Type of Home: House  Home Layout: Two level, Able to Live on Main level with bedroom/bathroom, Performs ADL's on one level  Home Access: Level entry  Bathroom Shower/Tub: Walk-in shower  Bathroom Toilet: Handicap height  Bathroom Equipment: Grab bars in shower, Shower chair, Grab bars around toilet  Home Equipment: Rolling walker  ADL Assistance: Independent (Pt reports independent but notes he has had a harder time recently and his son will help off and on.  Notes even when ADLs have been difficult he has been able to still perform his toileting independently.)  Homemaking Assistance: Needs assistance  Homemaking Responsibilities: No (son performs all IADLs and hired cleaning)  Ambulation Assistance: Independent (with walker)  Transfer Assistance: Independent  Active : No  Patient's  Info: son drives  Mode of Transportation: Family, SUV  Occupation: Retired  Type of occupation: Worked for an   Leisure & Hobbies: Enjoys spending time with family and watching TV  Additional Comments: Pt reports his son is retired and home most of the time. He states when his son has to leave that another family member will come stay with him. Pt is a questionable historian and no family is present to confirm any of the above home information. Objective   Vision: Impaired  Vision Exceptions: Wears glasses for reading  Hearing: Exceptions to [facility]  Hearing Exceptions: Hard of hearing/hearing concerns    Orientation  Overall Orientation Status: Impaired  Orientation Level: Oriented to place; Oriented to person; Disoriented to situation (oriented to month with cueing)        Balance  Sitting Balance: Moderate assistance  Standing Balance: Moderate assistance (x2)  Standing Balance  Time: ~2-3 minute bouts  Activity: static standing, functional mobility bed <> recliner chair  Comment: Mod A x2 with hand held assistance; increased time/effort; max verbal/tactile cues for initiation/sequencing/safety awareness/problem solving/attention to task; encouragement provided throughout as pt reporting fear of falling; significantly unsteady     ADL  Feeding: Scoop assist; Increased time to complete; Setup; Verbal cueing; Beverage management (breakfast tray setup including food cut up into bite size pieces and containers opened; max verbal/tactile cues throughout as pt attempting to eat napkin and pillow intermittently throughout feeding tasks)  Grooming: Verbal cueing; Increased time to complete; Moderate assistance  UE Bathing: Verbal cueing; Increased time to complete; Maximum assistance  LE Bathing: Verbal cueing;  Increased time to complete; Maximum assistance  UE Dressing: Increased time to complete; Verbal cueing; Maximum assistance (to thread hospital gown while seated EOB; max verbal/tactile cueing as pt initiated threading hospital gown then terminated task and attempted to thread legs into hospital gown)  LE Dressing: Verbal cueing; Increased time to complete; Maximum assistance (to don socks while seated EOB)  Toileting: Increased time to complete; Maximum assistance (for brief mngt)  Additional Comments: Max verbal/tactile cues for initiation/sequencing/problem solving/attention to task/safety awareness; increased time/effort; poor static/dynamic sitting and standing balance; decreased BUE strength and coordination     Tone RUE  RUE Tone: Normotonic  Tone LUE  LUE Tone: Normotonic  Coordination  Movements Are Fluid And Coordinated: No  Coordination and Movement description: Fine motor impairments; Gross motor impairments; Decreased speed; Decreased accuracy; Right UE; Left UE        Bed mobility  Supine to Sit: Maximum assistance  Sit to Supine: Maximum assistance; 2 Person assistance  Scooting: Maximal assistance     Transfers  Sit to stand: Moderate assistance; 2 Person assistance  Stand to sit: Moderate assistance; 2 Person assistance  Transfer Comments: Max verbal/tactile cueing for initiation/sequencing/safety awareness/problem solving; increased time/effort to perform; multiple trials required to reach a fully upright position        Cognition  Overall Cognitive Status: Exceptions  Arousal/Alertness: Delayed responses to stimuli  Following Commands: Follows one step commands with increased time; Follows one step commands with repetition (inconsistently)  Attention Span: Difficulty dividing attention;  Difficulty attending to directions  Memory: Decreased recall of recent events; Decreased short term memory; Decreased recall of precautions  Safety Judgement: Decreased awareness of need for assistance; Decreased awareness of need for safety  Problem Solving: Assistance required to identify errors made; Assistance required to correct errors made; Decreased awareness of errors  Insights: Decreased awareness of deficits  Initiation: Requires cues for all  Sequencing: Requires cues for all           Sensation  Overall Sensation Status: WFL (Pt denies any numbness or tingling)        LUE AROM (degrees)  LUE AROM : WFL  RUE AROM (degrees)  RUE AROM : WFL  LUE Strength  Gross LUE Strength:  (Grossly 4-/5)  L Hand General: 4-/5  RUE Strength  Gross RUE Strength:  (Grossly 4-/5)  R Hand General: 4-/5           Plan   Plan  Times per week: 5-6x/wk  Current Treatment Recommendations: Strengthening, ROM, Balance Training, Functional Mobility Training, Endurance Training, Cognitive Reorientation, Safety Education & Training, Equipment Evaluation, Education, & procurement, Patient/Caregiver Education & Training, Self-Care / ADL      AM-PAC Score   AM-PAC Inpatient Daily Activity Raw Score: 13 (12/06/21 1001)  AM-PAC Inpatient ADL T-Scale Score : 32.03 (12/06/21 1001)  ADL Inpatient CMS 0-100% Score: 63.03 (12/06/21 1001)  ADL Inpatient CMS G-Code Modifier : CL (12/06/21 1001)    Goals  Short term goals  Time Frame for Short term goals: 14 visits  Short term goal 1: Pt will demo ability to consistently follow 2-3 step commands for increased independence with ADL tasks  Short term goal 2: Pt will demo feeding/grooming tasks with setup and use of AE/DME PRN  Short term goal 3: Pt will demo UB ADL tasks with CGA including setup and use of AE/DME PRN  Short term goal 4: Pt will demo toileting/LB ADL tasks with min A including setup and use of AE/DME PRN  Short term goal 5: Pt will perform functional transfers/functional mobility with CGA using least restrictive AD  Short term goal 6: Pt will demo 8+ minutes standing tolerance with use of least restrictive AD for increased participation in ADL tasks       Therapy Time   Individual Concurrent Group Co-treatment   Time In 0903         Time Out 0935         Minutes 32         Timed Code Treatment Minutes: 10 Minutes       BHAVANI Crisostomo/GER

## 2021-12-06 NOTE — ED NOTES
Pt repeatedly using call light to use urinal- placed Prim[employer] External urine management device- connected to suction     Franck Alexander RN  12/05/21 7962

## 2021-12-06 NOTE — ED PROVIDER NOTES
[id number] [facility] ED  [address] [place] [address]  Phone: [phone]  Fax: [phone]        ADDENDUM:      Care of this patient was assumed from Dr. Kelly Cyr. The patient was seen for Fall  . The patient's initial evaluation and plan have been discussed with the prior provider who initially evaluated the patient. Nursing Notes, Past Medical Hx, Past Surgical Hx, Allergies, were all reviewed. PAST MEDICAL HISTORY    has a past medical history of Dyslipidemia, Essential (primary) hypertension, Permanent atrial fibrillation ([address] and Type 2 diabetes mellitus without complication, without long-term current use of insulin ([facility] [address] SURGICAL HISTORY      has a past surgical history that includes hip surgery. CURRENT MEDICATIONS       Previous Medications    ATORVASTATIN (LIPITOR) 40 MG TABLET    Take 40 mg by mouth daily    FINASTERIDE (PROSCAR) 5 MG TABLET    Take 5 mg by mouth daily    METFORMIN (GLUCOPHAGE-XR) 750 MG EXTENDED RELEASE TABLET    Take 750 mg by mouth daily (with breakfast) Pt uncertain as to dosage    METOPROLOL SUCCINATE PO    Take by mouth    OMEPRAZOLE (PRILOSEC) 20 MG DELAYED RELEASE CAPSULE    Take 20 mg by mouth daily    TAMSULOSIN (FLOMAX) 0.4 MG CAPSULE    Take 0.4 mg by mouth daily       ALLERGIES     has No Known Allergies. Diagnostic Results         LABS:   Results for orders placed or performed during the hospital encounter of 12/05/21   COVID-19, Rapid    Specimen: Nasopharyngeal Swab   Result Value Ref Range    Specimen Description . NASOPHARYNGEAL SWAB     SARS-CoV-2, Rapid Not Detected Not Detected   CBC Auto Differential   Result Value Ref Range    WBC 9.4 3.5 - 11.0 k/uL    RBC 4.05 (L) 4.5 - 5.9 m/uL    Hemoglobin 13.3 (L) 13.5 - 17.5 g/dL    Hematocrit 37.3 (L) 41 - 53 %    MCV 92.2 80 - 100 fL    MCH 32.8 26 - 34 pg    MCHC 35.5 31 - 37 g/dL    RDW 14.3 12.5 - 15.4 %    Platelets 025 078 - 997 k/uL    MPV 8.2 6.0 - 12.0 fL    NRBC Automated NOT REPORTED per 100 WBC    Differential Type NOT REPORTED     Seg Neutrophils 82 (H) 36 - 66 %    Lymphocytes 7 (L) 24 - 44 %    Monocytes 10 2 - 11 %    Eosinophils % 1 1 - 4 %    Basophils 0 0 - 2 %    Immature Granulocytes NOT REPORTED 0 %    Segs Absolute 7.70 1.8 - 7.7 k/uL    Absolute Lymph # 0.70 (L) 1.0 - 4.8 k/uL    Absolute Mono # 0.90 0.1 - 1.2 k/uL    Absolute Eos # 0.10 0.0 - 0.4 k/uL    Basophils Absolute 0.00 0.0 - 0.2 k/uL    Absolute Immature Granulocyte NOT REPORTED 0.00 - 0.30 k/uL    WBC Morphology NOT REPORTED     RBC Morphology NOT REPORTED     Platelet Estimate NOT REPORTED    Basic Metabolic Panel w/ Reflex to MG   Result Value Ref Range    Glucose 183 (H) 70 - 99 mg/dL    BUN 21 8 - 23 mg/dL    CREATININE 0.70 0.70 - 1.20 mg/dL    Bun/Cre Ratio NOT REPORTED 9 - 20    Calcium 9.9 8.6 - 10.4 mg/dL    Sodium 130 (L) 135 - 144 mmol/L    Potassium 4.3 3.7 - 5.3 mmol/L    Chloride 96 (L) 98 - 107 mmol/L    CO2 21 20 - 31 mmol/L    Anion Gap 13 9 - 17 mmol/L    GFR Non-African American >60 >60 mL/min    GFR African American >60 >60 mL/min    GFR Comment          GFR Staging NOT REPORTED    Urinalysis Reflex to Culture    Specimen: Urine, clean catch   Result Value Ref Range    Color, UA Yellow Yellow    Turbidity UA SLIGHTLY CLOUDY (A) Clear    Glucose, Ur NEGATIVE NEGATIVE    Bilirubin Urine NEGATIVE NEGATIVE    Ketones, Urine NEGATIVE NEGATIVE    Specific Gravity, UA 1.015 1.005 - 1.030    Urine Hgb NEGATIVE NEGATIVE    pH, UA 7.0 5.0 - 8.0    Protein, UA 2+ (A) NEGATIVE    Urobilinogen, Urine Normal Normal    Nitrite, Urine POSITIVE (A) NEGATIVE    Leukocyte Esterase, Urine MODERATE (A) NEGATIVE    Urinalysis Comments NOT REPORTED    SPECIMEN REJECTION   Result Value Ref Range    Specimen Source . BLOOD     Ordered Test PT PTT     Reason for Rejection Unable to perform testing: No specimen received.      - NOT REPORTED    Protime-INR   Result Value Ref Range    Protime 10.7 9.4 - 12.6 sec    INR 1.0    APTT   Result Value Ref Range    PTT 25.6 21.3 - 31.3 sec   Microscopic Urinalysis   Result Value Ref Range    -          WBC, UA TOO NUMEROUS TO COUNT 0 - 5 /HPF    RBC, UA 0 TO 2 0 - 2 /HPF    Casts UA NOT REPORTED /LPF    Crystals, UA NOT REPORTED None /HPF    Epithelial Cells UA 5 TO 10 0 - 5 /HPF    Renal Epithelial, UA NOT REPORTED 0 /HPF    Bacteria, UA MANY (A) None    Mucus, UA NOT REPORTED None    Trichomonas, UA NOT REPORTED None    Amorphous, UA NOT REPORTED None    Other Observations UA Culture ordered based on defined criteria. (A) NOT REQ. Yeast, UA NOT REPORTED None   EKG 12 Lead   Result Value Ref Range    Ventricular Rate 78 BPM    Atrial Rate 91 BPM    QRS Duration 80 ms    Q-T Interval 382 ms    QTc Calculation (Bazett) 435 ms    R Axis 3 degrees    T Axis 27 degrees       RADIOLOGY:  CT Head WO Contrast   Final Result   No acute intracranial abnormality. Diffuse atrophic changes with findings suggesting chronic microvascular   ischemia         CT FACIAL BONES WO CONTRAST   Final Result   No acute traumatic injury of the facial bones.              RECENT VITALS:  BP: 119/65, Temp: 98.7 °F (37.1 °C), Pulse: 77, Resp: 16     ED Course     The patient was given the following medications:  Orders Placed This Encounter   Medications    cefTRIAXone (ROCEPHIN) 1000 mg IVPB in 50 mL D5W minibag     Order Specific Question:   Antimicrobial Indications     Answer:   Urinary Tract Infection    0.9 % sodium chloride bolus    cephALEXin (KEFLEX) 500 MG capsule     Sig: Take 1 capsule by mouth 2 times daily for 10 days     Dispense:  20 capsule     Refill:  0    enoxaparin (LOVENOX) injection 40 mg    cefTRIAXone (ROCEPHIN) 1000 mg IVPB in 50 mL D5W minibag     Order Specific Question:   Antimicrobial Indications     Answer:   Urinary Tract Infection    albuterol (PROVENTIL) nebulizer solution 2.5 mg     Order Specific Question:   Initiate RT Bronchodilator Protocol     Answer: Yes       Medical Decision Making      ED Course as of 12/06/21 0725   [employer] Dec 05, 2021   1805 Patient wants to be discharged with his son stated time that he was unable to get his father to get up and walk by himself and he feels that he is not safe at home. Patient was assisted off the bed but even standing while using a walker and with a gait belt around him he could not take a single step and could not maintain his balance. The plan is to admit the patient now and he has been accepted by Dr. Cira Mayen. [SH]      ED Course User Index  [SH] Keesha Darden MD           EMERGENCY DEPARTMENT COURSE:   Vitals:    Vitals:    12/05/21 2104 12/05/21 2105 12/05/21 2230 12/06/21 0642   BP: (!) 151/73 (!) 151/73 119/65    Pulse: 77      Resp: 16      Temp: 98.7 °F (37.1 °C)      TempSrc: Oral      SpO2: 98% 97% 96% 95%   Weight:       Height:         -------------------------  BP: 119/65, Temp: 98.7 °F (37.1 °C), Pulse: 77, Resp: 16      RE-EVALUATION:  Patient presents with weakness is noted to have a urinary tract infection the rest of the work-up is essentially unremarkable he has been evaluated by our hospitalist service as well as our  and the patient will be placed with skilled nursing    CONSULTS:   evaluates the patient and they are going to place the patient into a skilled nursing facility for treatment of his urinary tract infection as well as physical therapy occupational therapy for evaluation of the patient's weakness    PROCEDURES:  None    FINAL IMPRESSION      1. Fall, initial encounter    2. Urinary tract infection without hematuria, site unspecified    3.  Unable to ambulate          DISPOSITION/PLAN   DISPOSITION Admitted 12/05/2021 06:05:53 PM      CONDITION ON DISPOSITION:   Stable    PATIENT REFERRED TO:  May Douglas, [address] [place] [address]  [phone]            DISCHARGE MEDICATIONS:  New Prescriptions    CEPHALEXIN (KEFLEX) 500 MG CAPSULE

## 2021-12-06 NOTE — DISCHARGE SUMMARY
any loss of consciousness. No syncopal episodes. CT of his head did not show any acute intracranial abnormality. It did show diffuse atrophic changes with findings suggestive of chronic microvascular ischemia. Patient was seen by PT OT, patient was unable to ambulate. Patient son did not feel comfortable taking him home, so patient was discharged to SNF. Urinalysis did show findings concerning with acute cystitis. Patient was started on antibiotics discharged to complete 5-day course. Currently, patient medically stable for discharge. He will need evaluation by neurology on outpatient for work-up of dementia. Significant therapeutic interventions: see above    Significant Diagnostic Studies:   Labs / Micro:  CBC:   Lab Results   Component Value Date    WBC 9.4 12/05/2021    RBC 4.05 12/05/2021    HGB 13.3 12/05/2021    HCT 37.3 12/05/2021    MCV 92.2 12/05/2021    MCH 32.8 12/05/2021    MCHC 35.5 12/05/2021    RDW 14.3 12/05/2021     12/05/2021     BMP:    Lab Results   Component Value Date    GLUCOSE 183 12/05/2021     12/05/2021    K 4.3 12/05/2021    CL 96 12/05/2021    CO2 21 12/05/2021    ANIONGAP 13 12/05/2021    BUN 21 12/05/2021    CREATININE 0.70 12/05/2021    BUNCRER NOT REPORTED 12/05/2021    CALCIUM 9.9 12/05/2021    LABGLOM >60 12/05/2021    GFRAA >60 12/05/2021    GFR      12/05/2021    GFR NOT REPORTED 12/05/2021        Radiology:  CT Head WO Contrast    Result Date: 12/5/2021  No acute intracranial abnormality. Diffuse atrophic changes with findings suggesting chronic microvascular ischemia     CT FACIAL BONES WO CONTRAST    Result Date: 12/5/2021  No acute traumatic injury of the facial bones. Consultations:    Consults:     Final Specialist Recommendations/Findings:   None      The patient was seen and examined on day of discharge and this discharge summary is in conjunction with any daily progress note from day of discharge. Discharge plan:     Disposition:  To a non-[facility] facility    Physician Follow Up:     Sancho Hanna MD  [address]  [address] [facility] [address]  [phone]    Schedule an appointment as soon as possible for a visit in 1 week  Hospital follow up    [facility]. [phone]      Dementia workup       Requiring Further Evaluation/Follow Up POST HOSPITALIZATION/Incidental Findings:   -Follow up with Neurology outpatient for further evaluation of dementia  -Follow-up with primary care physician within 1 week of discharge for posthospitalization follow-up  -Continue treatment for acute cystitis  -We will need PT OT on discharge    Diet: regular diet    Activity: As tolerated    Instructions to Patient: See above    Discharge Medications:      Medication List      START taking these medications    cephALEXin 500 MG capsule  Commonly known as: Keflex  Take 1 capsule by mouth 2 times daily for 5 days        CONTINUE taking these medications    atorvastatin 40 MG tablet  Commonly known as: LIPITOR     finasteride 5 MG tablet  Commonly known as: PROSCAR     METOPROLOL SUCCINATE PO     omeprazole 20 MG delayed release capsule  Commonly known as: PRILOSEC     tamsulosin 0.4 MG capsule  Commonly known as: FLOMAX        ASK your doctor about these medications    metFORMIN 750 MG extended release tablet  Commonly known as: Mamta Mac           Where to Get Your Medications      Information about where to get these medications is not yet available    Ask your nurse or doctor about these medications  · cephALEXin 500 MG capsule         No discharge procedures on file.     Time Spent on discharge is  35 mins in patient examination, evaluation, counseling as well as medication reconciliation, prescriptions for required medications, discharge plan and follow up. Electronically signed by   Josefina Quick DO  12/6/2021  1:22 PM      Thank you Dr. Ciera Ramirez MD for the opportunity to be involved in this patient's care.

## 2021-12-06 NOTE — DISCHARGE INSTR - COC
Continuity of Care Form    Patient Name: Sonu Cunningham   :  1935  MRN:  [id number]    Admit date:  2021  Discharge date:  2021    Code Status Order: Full Code   Advance Directives:      Admitting Physician:  No admitting provider for patient encounter. PCP: Sancho Hanna MD    Discharging Nurse: [facility] Unit/Room#: [facility] [facility]  Discharging Unit Phone Number: [phone]    Emergency Contact:   Extended Emergency Contact Information  Primary Emergency Contact: Medina Villarreal  Home Phone: [phone]  Relation: Child    Past Surgical History:  Past Surgical History:   Procedure Laterality Date    HIP SURGERY         Immunization History:   Immunization History   Administered Date(s) Administered    COVID-19, MAYRA Galan, 30mcg/0.3mL 2021, 2021, 2021       Active Problems:  Patient Active Problem List   Diagnosis Code    Unable to ambulate R26.2    Type 2 diabetes mellitus without complication, without long-term current use of insulin ([facility]) E11.9    Essential (primary) hypertension I10    Permanent atrial fibrillation ([facility] [address] I48.21    Acute cystitis without hematuria N30.00       Isolation/Infection:   Isolation            No Isolation          Patient Infection Status       None to display            Nurse Assessment:  Last Vital Signs: /65   Pulse 77   Temp 98.7 °F (37.1 °C) (Oral)   Resp 16   Ht 5' 10\" (1.778 m)   Wt 167 lb (75.8 kg)   SpO2 95%   BMI 23.96 kg/m²     Last documented pain score (0-10 scale):    Last Weight:   Wt Readings from Last 1 Encounters:   21 167 lb (75.8 kg)     Mental Status:  alert and confused. Patient follow directions. Patient is not aggressive and needs redirected.     IV Access:  - None    Nursing Mobility/ADLs:  Walking   Assisted  Transfer  Assisted  Bathing  Assisted  Dressing  Assisted  Toileting  Assisted  Feeding  Assisted  Med Admin  Assisted  Med Delivery   whole    Wound Care Documentation and Therapy: Elimination:  Continence: Bowel: Yes  Bladder: No  Urinary Catheter: None   Colostomy/Ileostomy/Ileal Conduit: No       Date of Last BM: 12/6/2021    Intake/Output Summary (Last 24 hours) at 12/6/2021 1003  Last data filed at 12/5/2021 2105  Gross per 24 hour   Intake 550 ml   Output 250 ml   Net 300 ml     I/O last 3 completed shifts: In: 550 [IV Piggyback:550]  Out: 250 [Urine:250]    Safety Concerns: At Risk for Falls    Impairments/Disabilities:      None    Nutrition Therapy:  Current Nutrition Therapy:   - Oral Diet:  General    Routes of Feeding: Oral  Liquids: No Restrictions  Daily Fluid Restriction: no  Last Modified Barium Swallow with Video (Video Swallowing Test): not done    Treatments at the Time of Hospital Discharge:   Respiratory Treatments: room air  Oxygen Therapy:  is not on home oxygen therapy. Ventilator:    - No ventilator support    Rehab Therapies: Physical Therapy and Occupational Therapy  Weight Bearing Status/Restrictions: No weight bearing restirctions  Other Medical Equipment (for information only, NOT a DME order):  walker  Other Treatments: n/a    Patient's personal belongings (please select all that are sent with patient):  clothing    RN SIGNATURE:  Electronically signed by Kemal Richard RN on 12/6/21 at 1:01 PM EST    CASE MANAGEMENT/SOCIAL WORK SECTION    Inpatient Status Date: n/a    Readmission Risk Assessment Score:  Readmission Risk              Risk of Unplanned Readmission:  0           Discharging to Facility/ Agency   Name: [facility]  Address:  Phone: [phone]  Fax:    Dialysis Facility (if applicable)   Name:  Address:  Dialysis Schedule:  Phone:  Fax:    / signature: Electronically signed by Ulysses Paganini, RN on 12/6/21 at 12:16 PM EST    PHYSICIAN SECTION    Prognosis: Fair    Condition at Discharge: Stable    Rehab Potential (if transferring to Rehab):  Fair    Recommended Labs or Other Treatments After Discharge: -Follow up with Neurology outpatient for further evaluation of dementia  -Follow-up with primary care physician within 1 week of discharge for posthospitalization follow-up  -Continue treatment for acute cystitis  -We will need PT OT     Physician Certification: I certify the above information and transfer of [place]en Bras  is necessary for the continuing treatment of the diagnosis listed and that he requires [facility] for less 30 days.      Update Admission H&P: No change in H&P    PHYSICIAN SIGNATURE:  Electronically signed by Alvarez Castellanos DO on 12/6/21 at 1:21 PM EST

## 2021-12-07 LAB
CULTURE: ABNORMAL
Lab: ABNORMAL
SPECIMEN DESCRIPTION: ABNORMAL